# Patient Record
Sex: FEMALE | Race: WHITE | NOT HISPANIC OR LATINO | URBAN - NONMETROPOLITAN AREA
[De-identification: names, ages, dates, MRNs, and addresses within clinical notes are randomized per-mention and may not be internally consistent; named-entity substitution may affect disease eponyms.]

---

## 2019-05-14 ENCOUNTER — ON CAMPUS - OUTPATIENT (OUTPATIENT)
Dept: URBAN - NONMETROPOLITAN AREA HOSPITAL 6 | Facility: HOSPITAL | Age: 54
End: 2019-05-14
Payer: COMMERCIAL

## 2019-05-14 DIAGNOSIS — K57.30 DIVERTICULOSIS OF LARGE INTESTINE WITHOUT PERFORATION OR ABS: ICD-10-CM

## 2019-05-14 DIAGNOSIS — K62.0 ANAL POLYP: ICD-10-CM

## 2019-05-14 DIAGNOSIS — Z12.11 ENCOUNTER FOR SCREENING FOR MALIGNANT NEOPLASM OF COLON: ICD-10-CM

## 2019-05-14 DIAGNOSIS — K64.8 OTHER HEMORRHOIDS: ICD-10-CM

## 2019-05-14 PROCEDURE — 45385 COLONOSCOPY W/LESION REMOVAL: CPT | Mod: 33 | Performed by: INTERNAL MEDICINE

## 2023-08-14 DIAGNOSIS — E66.01 MORBID OBESITY: ICD-10-CM

## 2023-08-14 DIAGNOSIS — G47.10 HYPERSOMNIA: Primary | ICD-10-CM

## 2023-08-14 DIAGNOSIS — G47.8 NON-RESTORATIVE SLEEP: ICD-10-CM

## 2023-10-19 ENCOUNTER — HOSPITAL ENCOUNTER (OUTPATIENT)
Dept: SLEEP MEDICINE | Facility: HOSPITAL | Age: 58
Discharge: HOME | End: 2023-10-19
Admitting: FAMILY MEDICINE
Payer: MEDICAID

## 2023-10-19 DIAGNOSIS — G47.8 NON-RESTORATIVE SLEEP: ICD-10-CM

## 2023-10-19 DIAGNOSIS — E66.01 MORBID OBESITY: ICD-10-CM

## 2023-10-19 DIAGNOSIS — G47.10 HYPERSOMNIA: ICD-10-CM

## 2023-10-19 PROCEDURE — 95806 SLEEP STUDY UNATT&RESP EFFT: CPT

## 2023-10-24 ENCOUNTER — ON CAMPUS - OUTPATIENT (OUTPATIENT)
Dept: URBAN - NONMETROPOLITAN AREA HOSPITAL 6 | Facility: HOSPITAL | Age: 58
End: 2023-10-24
Payer: COMMERCIAL

## 2023-10-24 DIAGNOSIS — K57.30 DIVERTICULOSIS OF LARGE INTESTINE WITHOUT PERFORATION OR ABS: ICD-10-CM

## 2023-10-24 DIAGNOSIS — K29.50 UNSPECIFIED CHRONIC GASTRITIS WITHOUT BLEEDING: ICD-10-CM

## 2023-10-24 DIAGNOSIS — K21.9 GASTRO-ESOPHAGEAL REFLUX DISEASE WITHOUT ESOPHAGITIS: ICD-10-CM

## 2023-10-24 DIAGNOSIS — Z86.010 PERSONAL HISTORY OF COLONIC POLYPS: ICD-10-CM

## 2023-10-24 DIAGNOSIS — R13.10 DYSPHAGIA, UNSPECIFIED: ICD-10-CM

## 2023-10-24 DIAGNOSIS — K20.80 OTHER ESOPHAGITIS WITHOUT BLEEDING: ICD-10-CM

## 2023-10-24 DIAGNOSIS — K22.2 ESOPHAGEAL OBSTRUCTION: ICD-10-CM

## 2023-10-24 DIAGNOSIS — K64.9 UNSPECIFIED HEMORRHOIDS: ICD-10-CM

## 2023-10-24 DIAGNOSIS — D12.3 BENIGN NEOPLASM OF TRANSVERSE COLON: ICD-10-CM

## 2023-10-24 PROCEDURE — 43239 EGD BIOPSY SINGLE/MULTIPLE: CPT | Performed by: INTERNAL MEDICINE

## 2023-10-24 PROCEDURE — 43450 DILATE ESOPHAGUS 1/MULT PASS: CPT | Performed by: INTERNAL MEDICINE

## 2023-10-24 PROCEDURE — 45385 COLONOSCOPY W/LESION REMOVAL: CPT | Mod: PT | Performed by: INTERNAL MEDICINE

## 2023-10-29 DIAGNOSIS — G47.33 OBSTRUCTIVE SLEEP APNEA: Primary | ICD-10-CM

## 2023-10-29 DIAGNOSIS — G47.10 HYPERSOMNIA: ICD-10-CM

## 2023-10-29 DIAGNOSIS — G47.8 NON-RESTORATIVE SLEEP: ICD-10-CM

## 2024-06-07 NOTE — PROGRESS NOTES
CHIEF COMPLAINT  Neck pain      Subjective   Leann Anaya is a 58 y.o. female who was referred by Tyler Jaramillo MD to our pain management clinic for consultation, evaluation and treatment of Neck pain. Neck pain started long time ago without any injuries. She has tried NSAIDs, PT without significant improvement in pain.      Neck pain is 4/10 on VAS, at maximum is 9/10. Pain is achy, numbness, tingling, shooting in nature. Pain is referred left triceps, forearm and fingers. The pain is constant. The pain is improved by PT, meloxicam, flexeril. The pain is worse with turning head, increased activities. +headaches - once-twice a week - left side - start backs of head and wraps to top of head.  Her main complaint is pain in the base of her neck and headaches.     SOAPP- 6  [place] back disability scale - 40    PMH:   BPV, AMY, COPD, smoker- 1 PPD    Current Medications:   Meloxicam 15 mg daily as needed  Flexeril 10 mg   Meclizine      Past Medications:  Percocet - caused nausea     Past Modalities:  TENS:       no          Physical Therapy Within The Last 6 Months     Yes- [facility] - 3/2024 - >7 weeks.   Psychotherapy     no  Massage Therapy      no    Patient Complains Of:  Uro-Fecal Incontinence no  Weight Gain/Loss  no  Fever/Chills   no  Weakness   Yes - Left arm subjective weakness.       PEG Assessment   What number best describes your pain on average in the past week?4  What number best describes how, during the past week, pain has interfered with your enjoyment of life?6  What number best describes how, during the past week, pain has interfered with your general activity?  5      PHQ-9 Depression Screening  Little interest or pleasure in doing things? 2-->more than half the days   Feeling down, depressed, or hopeless? 1-->several days   Trouble falling or staying asleep, or sleeping too much? 3-->nearly every day   Feeling tired or having little energy? 3-->nearly every day   Poor  appetite or overeating? 1-->several days   Feeling bad about yourself - or that you are a failure or have let yourself or your family down? 1-->several days   Trouble concentrating on things, such as reading the newspaper or watching television? 2-->more than half the days   Moving or speaking so slowly that other people could have noticed? Or the opposite - being so fidgety or restless that you have been moving around a lot more than usual? 0-->not at all   Thoughts that you would be better off dead, or of hurting yourself in some way? 0-->not at all   PHQ-9 Total Score 13   If you checked off any problems, how difficult have these problems made it for you to do your work, take care of things at home, or get along with other people? somewhat difficult         Current Outpatient Medications:     azelastine (ASTELIN) 0.1 % nasal spray, 1 spray 2 (Two) Times a Day., Disp: , Rfl:     cyclobenzaprine (FLEXERIL) 10 MG tablet, Take 1 tablet by mouth 3 times a day., Disp: , Rfl:     fluticasone (FLONASE) 50 MCG/ACT nasal spray, 1 spray by Each Nare route 2 (Two) Times a Day., Disp: , Rfl:     hydroCHLOROthiazide (MICROZIDE) 12.5 MG capsule, Take 1 capsule by mouth Every Morning., Disp: , Rfl:     meclizine (ANTIVERT) 12.5 MG tablet, TAKE ONE TABLET BY MOUTH EVERY 12 HOURS AS NEEDED FOR 30 DAYS, Disp: , Rfl:     meloxicam (MOBIC) 15 MG tablet, Take 1 tablet by mouth Daily., Disp: , Rfl:     montelukast (SINGULAIR) 10 MG tablet, Take 1 tablet by mouth Every Evening., Disp: , Rfl:     The following portions of the patient's history were reviewed and updated as appropriate: allergies, current medications, past family history, past medical history, past social history, past surgical history, and problem list.      REVIEW OF PERTINENT MEDICAL DATA    Past Medical History:   Diagnosis Date    COPD (chronic obstructive pulmonary disease)     Neck pain on left side     Sleep apnea      Past Surgical History:   Procedure Laterality  Date    CHOLECYSTECTOMY       Family History   Problem Relation Age of Onset    Stroke Mother     Cancer Father     Cancer Sister      Social History     Socioeconomic History    Marital status:    Tobacco Use    Smoking status: Every Day     Current packs/day: 1.00     Types: Cigarettes   Vaping Use    Vaping status: Never Used   Substance and Sexual Activity    Alcohol use: Not Currently    Drug use: Never    Sexual activity: Defer         Review of Systems   Musculoskeletal:  Positive for arthralgias and neck pain.   Neurological:  Positive for headaches.         Vitals:    06/11/24 1016   BP: 138/85   BP Location: Right arm   Patient Position: Sitting   Cuff Size: Large Adult   Pulse: 97   Resp: 16   SpO2: 96%   Weight: 102 kg (225 lb)   PainSc:   4         Objective   Physical Exam  Neck:     Musculoskeletal:         General: Tenderness present.        Arms:    Neurological:      Deep Tendon Reflexes:      Reflex Scores:       Tricep reflexes are 2+ on the right side and 2+ on the left side.       Bicep reflexes are 2+ on the right side and 2+ on the left side.       Brachioradialis reflexes are 2+ on the right side and 2+ on the left side.     Comments: Motor strength 5/5 b/l LE  Sensory intact b/l LE               Imaging Reviewed:    MRI cervical spine without contrast-4/19/2024-priority radiology  - C4-5-mild disc bulge  C5 6- facet arthropathy more on the right side.  Mild to moderate spinal canal stenosis and moderate right neuroforaminal stenosis  C6-7-left foraminal disc bulge with left greater than right facet arthropathy.  Mild canal stenosis.  Moderate left and mild to moderate right neuroforaminal stenosis  C7-T1-no significant stenosis    Bilateral upper extremity EMG-1/18/2024-Dr. Bonilla  -Normal NCS of left arm.  Showed increased insertional activity from the left lower cervical paraspinal consistent with mild C6-7 radiculopathy.    Assessment:    1. Occipital neuralgia, unspecified  laterality    2. Cervical radiculopathy    3. Cervical spondylosis    4. Occipital headache         Plan:   1.  Defer UDS for now.  2. We discussed trying a course of formal physical therapy.  Physical therapy can help strengthen and stretch the muscles around the joints. Continue to be as active as possible.  Patient has done physical therapy for more than 6 weeks.  3.  Discussed with patient that there are 2 issues affecting her pain.  I believe occipital neuralgia is causing pain in the base of her neck and headaches and cervical stenosis at C6-7 is causing the radicular pain.  Will proceed with occipital nerve block first as her main complaint is headaches and base of neck pain.  4. Patient has pain in the head with referred pain on the top of the head. Bilateral occipital tenderness present. Discussed bilateral greater and lesser occipital nerve block. Discussed the possibility of infection, bleeding, nerve damage, headache, increased pain. Patient understands and agrees.   5.  MRI cervical spine and EMG were discussed with patient.  Patient has neck pain with cervical radiculopathy to left arm consistent with C6 dermatome.  Cervical MRI shows moderate left foraminal stenosis at C6-7 and moderate spinal canal stenosis at C5-C6.  EMG also shows C6-7 radiculopathy on the left side.  She may benefit from ASHANTI C6-7 on the left side in future.  6. Patient screened positive for depression based on a PHQ-9 score of 13 on 6/11/2024. Follow-up recommendations include: Suicide Risk Assessment performed.    RTC for injection and then 3 week follow up.     Reynaldo Pearce DO  Pain Management   [facility]         INSPECT REPORT    As part of the patient's treatment plan, I may be prescribing controlled substances. The patient has been made aware of appropriate use of such medications, including potential risk of somnolence, limited ability to drive and/or work safely, and the potential for dependence or overdose. It has  also been made clear that these medications are for use by this patient only, without concomitant use of alcohol or other substances unless prescribed.     Patient has completed prescribing agreement detailing terms of continued prescribing of controlled substances, including monitoring INSPECT reports, urine drug screening, and pill counts if necessary. The patient is aware that inappropriate use will results in cessation of prescribing such medications.    INSPECT report has been reviewed and scanned into the patient's chart.      EMR Dragon/Transcription Disclaimer:   Much of this encounter note is an electronic transcription/translation of spoken language to printed text. The electronic translation of spoken language may permit erroneous, or at times, nonsensical words or phrases to be inadvertently transcribed; Although I have reviewed the note for such errors, some may still exist.

## 2024-06-11 ENCOUNTER — OFFICE VISIT (OUTPATIENT)
Dept: PAIN MEDICINE | Facility: CLINIC | Age: 59
End: 2024-06-11
Payer: MEDICAID

## 2024-06-11 VITALS
HEART RATE: 97 BPM | SYSTOLIC BLOOD PRESSURE: 138 MMHG | OXYGEN SATURATION: 96 % | WEIGHT: 225 LBS | DIASTOLIC BLOOD PRESSURE: 85 MMHG | RESPIRATION RATE: 16 BRPM

## 2024-06-11 DIAGNOSIS — R51.9 OCCIPITAL HEADACHE: ICD-10-CM

## 2024-06-11 DIAGNOSIS — M54.81 OCCIPITAL NEURALGIA, UNSPECIFIED LATERALITY: Primary | ICD-10-CM

## 2024-06-11 DIAGNOSIS — M54.12 CERVICAL RADICULOPATHY: ICD-10-CM

## 2024-06-11 DIAGNOSIS — M47.812 CERVICAL SPONDYLOSIS: ICD-10-CM

## 2024-06-11 PROCEDURE — 1159F MED LIST DOCD IN RCRD: CPT | Performed by: STUDENT IN AN ORGANIZED HEALTH CARE EDUCATION/TRAINING PROGRAM

## 2024-06-11 PROCEDURE — 99204 OFFICE O/P NEW MOD 45 MIN: CPT | Performed by: STUDENT IN AN ORGANIZED HEALTH CARE EDUCATION/TRAINING PROGRAM

## 2024-06-11 PROCEDURE — 1125F AMNT PAIN NOTED PAIN PRSNT: CPT | Performed by: STUDENT IN AN ORGANIZED HEALTH CARE EDUCATION/TRAINING PROGRAM

## 2024-06-11 PROCEDURE — 1160F RVW MEDS BY RX/DR IN RCRD: CPT | Performed by: STUDENT IN AN ORGANIZED HEALTH CARE EDUCATION/TRAINING PROGRAM

## 2024-06-11 RX ORDER — MELOXICAM 15 MG/1
1 TABLET ORAL DAILY
COMMUNITY
Start: 2024-05-30

## 2024-06-11 RX ORDER — AZELASTINE 1 MG/ML
1 SPRAY, METERED NASAL 2 TIMES DAILY
COMMUNITY
Start: 2024-05-14

## 2024-06-11 RX ORDER — FLUTICASONE PROPIONATE 50 MCG
1 SPRAY, SUSPENSION (ML) NASAL 2 TIMES DAILY
COMMUNITY
Start: 2024-05-14

## 2024-06-11 RX ORDER — HYDROCHLOROTHIAZIDE 12.5 MG/1
12.5 CAPSULE, GELATIN COATED ORAL EVERY MORNING
COMMUNITY
Start: 2024-04-23

## 2024-06-11 RX ORDER — MONTELUKAST SODIUM 10 MG/1
10 TABLET ORAL EVERY EVENING
COMMUNITY
Start: 2024-05-14

## 2024-06-11 RX ORDER — CYCLOBENZAPRINE HCL 10 MG
1 TABLET ORAL 3 TIMES DAILY
COMMUNITY
Start: 2024-04-25

## 2024-06-11 RX ORDER — MECLIZINE HCL 12.5 MG/1
TABLET ORAL
COMMUNITY
Start: 2024-05-14

## 2024-06-25 ENCOUNTER — PROCEDURE VISIT (OUTPATIENT)
Dept: PAIN MEDICINE | Facility: CLINIC | Age: 59
End: 2024-06-25
Payer: MEDICAID

## 2024-06-25 VITALS
WEIGHT: 242 LBS | SYSTOLIC BLOOD PRESSURE: 123 MMHG | RESPIRATION RATE: 16 BRPM | DIASTOLIC BLOOD PRESSURE: 87 MMHG | OXYGEN SATURATION: 94 % | HEART RATE: 90 BPM

## 2024-06-25 DIAGNOSIS — M54.81 OCCIPITAL NEURALGIA, UNSPECIFIED LATERALITY: Primary | ICD-10-CM

## 2024-06-25 PROCEDURE — 64450 NJX AA&/STRD OTHER PN/BRANCH: CPT | Performed by: STUDENT IN AN ORGANIZED HEALTH CARE EDUCATION/TRAINING PROGRAM

## 2024-06-25 PROCEDURE — 64405 NJX AA&/STRD GR OCPL NRV: CPT | Performed by: STUDENT IN AN ORGANIZED HEALTH CARE EDUCATION/TRAINING PROGRAM

## 2024-06-25 RX ORDER — BUPIVACAINE HYDROCHLORIDE 2.5 MG/ML
10 INJECTION, SOLUTION INFILTRATION; PERINEURAL ONCE
Status: COMPLETED | OUTPATIENT
Start: 2024-06-25 | End: 2024-06-25

## 2024-06-25 RX ORDER — METHYLPREDNISOLONE ACETATE 40 MG/ML
40 INJECTION, SUSPENSION INTRA-ARTICULAR; INTRALESIONAL; INTRAMUSCULAR; SOFT TISSUE ONCE
Status: COMPLETED | OUTPATIENT
Start: 2024-06-25 | End: 2024-06-25

## 2024-06-25 RX ADMIN — BUPIVACAINE HYDROCHLORIDE 10 ML: 2.5 INJECTION, SOLUTION INFILTRATION; PERINEURAL at 11:32

## 2024-06-25 RX ADMIN — METHYLPREDNISOLONE ACETATE 40 MG: 40 INJECTION, SUSPENSION INTRA-ARTICULAR; INTRALESIONAL; INTRAMUSCULAR; SOFT TISSUE at 11:33

## 2024-06-25 NOTE — PROGRESS NOTES
H and P reviewed from previous visit and no changes to patient's clinical presentation. Will proceed with procedure as planned. Patient denies history of DM and being on blood thinners.    Reynaldo Pearce DO  Pain Management   [facility]     Pre-procedure diagnosis: B/l occipital neuralgia    The patient's chart was reviewed.  After obtaining written informed consent, the patient was placed in a sitting position with the cervical spine flexed and the forehead on a padded bedside table.  The right occipital artery was palpated at the level of the superior nuchal ridge.  The area was prepped with antiseptic solution.  A 27 gauge 1 1/2 inch needle is inserted just medial to the artery and advanced perpendicular until the needle approached the periostium of the underlying occipital bone.  Then, the needle was redirected superiorly.  After gentle aspiration, 2 ml of the solution was injected in a fanlike manner.  The right lesser occipital nerve was blocked by redirecting the needle laterally and slightly inferiorly.  After gentle aspiration, an additional 2 ml of the solution was injected. The procedure was repeated to inject around the left greater and lesser occipital nerve. A total mixture of 10 ml of 0.25% marcaine with 80 mg depo-medrol was injected slowly.    The patient tolerated the procedure well.  The needle was flushed and withdrawn, and a Band-Aid was applied.    Reynaldo Pearce DO  Pain Management   [facility]

## 2024-07-15 NOTE — PROGRESS NOTES
Subjective   Leann Anaya is a 58 y.o. female is here for follow up for neck pain. Patient was last seen for occipital nerve block with improvement in headaches, but still continues to have neck pain.    On last visit:     Neck pain is 4/10 on VAS, at maximum is 9/10. Pain is achy, numbness, tingling, shooting in nature. Pain is referred left triceps, forearm and fingers. The pain is constant. The pain is improved by PT, meloxicam, flexeril. The pain is worse with turning head, increased activities. +headaches - once-twice a week - left side - start backs of head and wraps to top of head.  Her main complaint is pain in the base of her neck and headaches.    Previous Injection:   6/11/2024-B/l occipital nerve block- 80% improvement in headaches since last visit. Only had 2 headaches since the injection.     Hx: Referred by Tyler Jaramillo MD for Neck pain. Neck pain started long time ago without any injuries. She has tried NSAIDs, PT without significant improvement in pain.                  SOAPP- 6  [place] back disability scale - 40     PMH:   BPV, AMY, COPD, smoker- 1 PPD     Current Medications:   Meloxicam 15 mg daily as needed  Flexeril 10 mg   Meclizine        Past Medications:  Percocet - caused nausea      Past Modalities:  TENS:                                                                          no                                                  Physical Therapy Within The Last 6 Months              Yes- [facility] - 3/2024 - >7 weeks.   Psychotherapy                                                            no  Massage Therapy                                                       no     Patient Complains Of:  Uro-Fecal Incontinence          no  Weight Gain/Loss                   no  Fever/Chills                             no  Weakness                               Yes - Left arm subjective weakness.         PEG Assessment   What number best describes your pain on average in the  past week?4  What number best describes how, during the past week, pain has interfered with your enjoyment of life?6  What number best describes how, during the past week, pain has interfered with your general activity?  5         Current Outpatient Medications:     azelastine (ASTELIN) 0.1 % nasal spray, 1 spray 2 (Two) Times a Day., Disp: , Rfl:     cyclobenzaprine (FLEXERIL) 10 MG tablet, Take 1 tablet by mouth 3 times a day., Disp: , Rfl:     fluticasone (FLONASE) 50 MCG/ACT nasal spray, 1 spray by Each Nare route 2 (Two) Times a Day., Disp: , Rfl:     hydroCHLOROthiazide (MICROZIDE) 12.5 MG capsule, Take 1 capsule by mouth Every Morning., Disp: , Rfl:     meclizine (ANTIVERT) 12.5 MG tablet, TAKE ONE TABLET BY MOUTH EVERY 12 HOURS AS NEEDED FOR 30 DAYS, Disp: , Rfl:     meloxicam (MOBIC) 15 MG tablet, Take 1 tablet by mouth Daily., Disp: , Rfl:     montelukast (SINGULAIR) 10 MG tablet, Take 1 tablet by mouth Every Evening., Disp: , Rfl:     The following portions of the patient's history were reviewed and updated as appropriate: allergies, current medications, past family history, past medical history, past social history, past surgical history, and problem list.      REVIEW OF PERTINENT MEDICAL DATA    Past Medical History:   Diagnosis Date    Cervical disc disorder     Chronic pain disorder     Cluster headache     COPD (chronic obstructive pulmonary disease)     Headache     Headache, tension-type     Joint pain     Low back pain     Migraine     Neck pain on left side     Sleep apnea      Past Surgical History:   Procedure Laterality Date    CHOLECYSTECTOMY       Family History   Problem Relation Age of Onset    Stroke Mother     Cancer Father     Cancer Sister     Early death Maternal Grandmother      Social History     Socioeconomic History    Marital status:    Tobacco Use    Smoking status: Every Day     Current packs/day: 1.00     Average packs/day: 1 pack/day for 15.0 years (15.0 ttl pk-yrs)      Types: Cigarettes   Vaping Use    Vaping status: Never Used   Substance and Sexual Activity    Alcohol use: Not Currently    Drug use: Never    Sexual activity: Not Currently     Partners: Male     Birth control/protection: Condom, Abstinence         Review of Systems   Musculoskeletal:  Positive for arthralgias and neck pain.   Neurological:  Positive for headaches.         Vitals:    07/16/24 0856   BP: 150/80   Pulse: 91   Resp: 16   SpO2: 98%   Weight: 110 kg (242 lb)   PainSc:   6         Objective   Physical Exam  Neck:     Musculoskeletal:         General: Tenderness present.        Arms:    Neurological:      Deep Tendon Reflexes:      Reflex Scores:       Tricep reflexes are 2+ on the right side and 2+ on the left side.       Bicep reflexes are 2+ on the right side and 2+ on the left side.       Brachioradialis reflexes are 2+ on the right side and 2+ on the left side.     Comments: Motor strength 5/5 b/l LE  Sensory intact b/l LE               Imaging Reviewed:  MRI cervical spine without contrast-4/19/2024-priority radiology  - C4-5-mild disc bulge  C5 6- facet arthropathy more on the right side.  Mild to moderate spinal canal stenosis and moderate right neuroforaminal stenosis  C6-7-left foraminal disc bulge with left greater than right facet arthropathy.  Mild canal stenosis.  Moderate left and mild to moderate right neuroforaminal stenosis  C7-T1-no significant stenosis     Bilateral upper extremity EMG-1/18/2024-Dr. Bonilla  -Normal NCS of left arm.  Showed increased insertional activity from the left lower cervical paraspinal consistent with mild C6-7 radiculopathy.    Assessment:    1. Cervical radiculopathy    2. Cervical spondylosis    3. Occipital neuralgia, unspecified laterality         Plan:   1.  Defer UDS for now.  2. We discussed trying a course of formal physical therapy.  Physical therapy can help strengthen and stretch the muscles around the joints. Continue to be as active as  possible.  Patient has done physical therapy for more than 6 weeks.  3.  Discussed with patient that there are 2 issues affecting her pain.  I believe occipital neuralgia is causing pain in the base of her neck and headaches and cervical stenosis at C6-7 is causing the radicular pain.    4. Excellent relief with bilateral greater and lesser occipital nerve block. Repeat PRN.   5.  MRI cervical spine and EMG were discussed with patient.  Patient has neck pain with cervical radiculopathy to left arm consistent with C6 dermatome.  Cervical MRI shows moderate left foraminal stenosis at C6-7 and moderate spinal canal stenosis at C5-C6.  EMG also shows C6-7 radiculopathy on the left side.  She may benefit from ASHANTI C6-7 on the left side. Benefits and risks were discussed. Agreed to proceed. .    RTC  for ASHANTI.     Reynaldo Pearce DO  Pain Management   [facility]                INSPECT REPORT    As part of the patient's treatment plan, I may be prescribing controlled substances. The patient has been made aware of appropriate use of such medications, including potential risk of somnolence, limited ability to drive and/or work safely, and the potential for dependence or overdose. It has also been made clear that these medications are for use by this patient only, without concomitant use of alcohol or other substances unless prescribed.     Patient has completed prescribing agreement detailing terms of continued prescribing of controlled substances, including monitoring INSPECT reports, urine drug screening, and pill counts if necessary. The patient is aware that inappropriate use will results in cessation of prescribing such medications.    INSPECT report has been reviewed and scanned into the patient's chart.

## 2024-07-16 ENCOUNTER — OFFICE VISIT (OUTPATIENT)
Dept: PAIN MEDICINE | Facility: CLINIC | Age: 59
End: 2024-07-16
Payer: MEDICAID

## 2024-07-16 VITALS
SYSTOLIC BLOOD PRESSURE: 150 MMHG | OXYGEN SATURATION: 98 % | RESPIRATION RATE: 16 BRPM | DIASTOLIC BLOOD PRESSURE: 80 MMHG | WEIGHT: 242 LBS | HEART RATE: 91 BPM

## 2024-07-16 DIAGNOSIS — M54.12 CERVICAL RADICULOPATHY: Primary | ICD-10-CM

## 2024-07-16 DIAGNOSIS — M47.812 CERVICAL SPONDYLOSIS: ICD-10-CM

## 2024-07-16 DIAGNOSIS — M54.81 OCCIPITAL NEURALGIA, UNSPECIFIED LATERALITY: ICD-10-CM

## 2024-07-16 PROCEDURE — 1159F MED LIST DOCD IN RCRD: CPT | Performed by: STUDENT IN AN ORGANIZED HEALTH CARE EDUCATION/TRAINING PROGRAM

## 2024-07-16 PROCEDURE — 1125F AMNT PAIN NOTED PAIN PRSNT: CPT | Performed by: STUDENT IN AN ORGANIZED HEALTH CARE EDUCATION/TRAINING PROGRAM

## 2024-07-16 PROCEDURE — 1160F RVW MEDS BY RX/DR IN RCRD: CPT | Performed by: STUDENT IN AN ORGANIZED HEALTH CARE EDUCATION/TRAINING PROGRAM

## 2024-07-16 PROCEDURE — 99214 OFFICE O/P EST MOD 30 MIN: CPT | Performed by: STUDENT IN AN ORGANIZED HEALTH CARE EDUCATION/TRAINING PROGRAM

## 2024-08-08 ENCOUNTER — HOSPITAL ENCOUNTER (OUTPATIENT)
Dept: PAIN MEDICINE | Facility: HOSPITAL | Age: 59
Discharge: HOME | End: 2024-08-08
Payer: MEDICAID

## 2024-08-08 VITALS
RESPIRATION RATE: 16 BRPM | DIASTOLIC BLOOD PRESSURE: 90 MMHG | BODY MASS INDEX: 41.32 KG/M2 | WEIGHT: 242 LBS | HEIGHT: 64 IN | HEART RATE: 83 BPM | SYSTOLIC BLOOD PRESSURE: 159 MMHG | OXYGEN SATURATION: 96 % | TEMPERATURE: 96.9 F

## 2024-08-08 DIAGNOSIS — R52 PAIN: ICD-10-CM

## 2024-08-08 DIAGNOSIS — M54.12 CERVICAL RADICULOPATHY: Primary | ICD-10-CM

## 2024-08-08 PROCEDURE — 25510000001 IOPAMIDOL 41 % SOLUTION: Performed by: STUDENT IN AN ORGANIZED HEALTH CARE EDUCATION/TRAINING PROGRAM

## 2024-08-08 PROCEDURE — 62321 NJX INTERLAMINAR CRV/THRC: CPT | Performed by: STUDENT IN AN ORGANIZED HEALTH CARE EDUCATION/TRAINING PROGRAM

## 2024-08-08 PROCEDURE — 25010000002 DEXAMETHASONE SODIUM PHOSPHATE 10 MG/ML SOLUTION: Performed by: STUDENT IN AN ORGANIZED HEALTH CARE EDUCATION/TRAINING PROGRAM

## 2024-08-08 PROCEDURE — 77003 FLUOROGUIDE FOR SPINE INJECT: CPT

## 2024-08-08 RX ORDER — DEXAMETHASONE SODIUM PHOSPHATE 10 MG/ML
10 INJECTION, SOLUTION INTRAMUSCULAR; INTRAVENOUS ONCE
Status: COMPLETED | OUTPATIENT
Start: 2024-08-08 | End: 2024-08-08

## 2024-08-08 RX ORDER — IOPAMIDOL 408 MG/ML
3 INJECTION, SOLUTION INTRATHECAL
Status: COMPLETED | OUTPATIENT
Start: 2024-08-08 | End: 2024-08-08

## 2024-08-08 RX ADMIN — IOPAMIDOL 3 ML: 408 INJECTION, SOLUTION INTRATHECAL at 09:09

## 2024-08-08 RX ADMIN — DEXAMETHASONE SODIUM PHOSPHATE 10 MG: 10 INJECTION, SOLUTION INTRAMUSCULAR; INTRAVENOUS at 09:10

## 2024-08-08 NOTE — H&P
H and P reviewed from previous visit and no changes to patient's clinical presentation. Will proceed with procedure as planned. Patient denies history of DM and being on blood thinners.    Reynaldo Pearce DO  Pain Management   [facility]

## 2024-08-08 NOTE — DISCHARGE INSTRUCTIONS

## 2024-08-08 NOTE — PROCEDURES
PREOPERATIVE DIAGNOSIS:    1. Cervical DDD    POSTOPERATIVE DIAGNOSIS:  Same.    PROCEDURE PERFORMED: Cervical GERA C6-7     PROCEDURE IN DETAIL:    Written informed consent was taken from the patient. Pre-procedure blood pressure and pulse were stable and recorded in patients clinic chart. The patient was brought to the procedure room and placed in the prone position on fluoroscopy table. The patient’s neck was prepped with chloraprep and draped in the usual sterile fashion.   The skin over the C6-7 space was identified under fluoroscopic guidance and infiltrated with 1% lidocaine for local anesthesia via 25 gauge needle.  A 17 gauge Tuohy needle was inserted through the skin under fluoroscopic guidance until we got to the epidural space with loss of resistance technique.  Negative for CSF and heme.  2 ml of omnipaque contrast was injected under continuous fluoroscopic guidance and good spread was noted from C5-7 space bilaterally. 10 mg of dexamethasone mixed with 4 ml of preservative free normal saline was injected with minimal pressure. The needle was cleared with preservative free normal saline and removed. Band aid was applied.  Following the procedure the patient's vital signs were stable. The patient was discharged home in good condition after being given discharge instructions.    COMPLICATIONS: None    Reynaldo Pearce DO  Pain Management   [facility]

## 2024-08-09 ENCOUNTER — TELEPHONE (OUTPATIENT)
Dept: PAIN MEDICINE | Facility: HOSPITAL | Age: 59
End: 2024-08-09
Payer: MEDICAID

## 2024-08-23 NOTE — PROGRESS NOTES
Subjective   Leann Anaya is a 58 y.o. female is here for follow up for neck pain.  Patient was last seen for ASHANTI on 8/8/2024.    On last visit:     Neck pain is 4/10 on VAS, at maximum is 6/10. Pain is achiness in nature. Pain is referred left triceps, forearm and fingers- this is mostly resolved with ASHANTI. The pain is constant. The pain is improved by PT, meloxicam, flexeril, ASHANTI. The pain is worse with turning head, increased activities. +headaches - once-twice a week - left side - start backs of head and wraps to top of head.  Her main complaint is pain in the base of her neck and headaches.    Previous Injection:   8/8/2024-ASHANTI C6-7- 70% pain improvement. Gripping is better. No locking of neck. No headaches.   6/11/2024-B/l occipital nerve block- 80% improvement in headaches since last visit. Only had 2 headaches since the injection.     Hx: Referred by Tyler Jaramillo MD for Neck pain. Neck pain started long time ago without any injuries. She has tried NSAIDs, PT without significant improvement in pain.                  SOAPP- 6  [place] back disability scale - 40     PMH:   DM-2, BPV, AMY, COPD, smoker- 1 PPD     Current Medications:   Meloxicam 15 mg daily as needed  Flexeril 10 mg   Meclizine        Past Medications:  Percocet - caused nausea      Past Modalities:  TENS:                                                                          no                                                  Physical Therapy Within The Last 6 Months              Yes- [facility] rehab - 3/2024 - >7 weeks.   Psychotherapy                                                            no  Massage Therapy                                                       no     Patient Complains Of:  Uro-Fecal Incontinence          no  Weight Gain/Loss                   no  Fever/Chills                             no  Weakness                               Yes - Left arm subjective weakness.         PEG Assessment   What  number best describes your pain on average in the past week?4  What number best describes how, during the past week, pain has interfered with your enjoyment of life?6  What number best describes how, during the past week, pain has interfered with your general activity?  5         Current Outpatient Medications:     azelastine (ASTELIN) 0.1 % nasal spray, 1 spray 2 (Two) Times a Day., Disp: , Rfl:     cyclobenzaprine (FLEXERIL) 10 MG tablet, Take 1 tablet by mouth 3 times a day., Disp: , Rfl:     famotidine (PEPCID) 40 MG tablet, Take 1 tablet by mouth Daily With Breakfast., Disp: , Rfl:     fluticasone (FLONASE) 50 MCG/ACT nasal spray, 1 spray by Each Nare route 2 (Two) Times a Day., Disp: , Rfl:     hydroCHLOROthiazide (MICROZIDE) 12.5 MG capsule, Take 1 capsule by mouth Every Morning., Disp: , Rfl:     meclizine (ANTIVERT) 12.5 MG tablet, TAKE ONE TABLET BY MOUTH EVERY 12 HOURS AS NEEDED FOR 30 DAYS, Disp: , Rfl:     meloxicam (MOBIC) 15 MG tablet, Take 1 tablet by mouth Daily., Disp: , Rfl:     metFORMIN (GLUCOPHAGE) 500 MG tablet, Daily., Disp: , Rfl:     montelukast (SINGULAIR) 10 MG tablet, Take 1 tablet by mouth Every Evening., Disp: , Rfl:     The following portions of the patient's history were reviewed and updated as appropriate: allergies, current medications, past family history, past medical history, past social history, past surgical history, and problem list.      REVIEW OF PERTINENT MEDICAL DATA    Past Medical History:   Diagnosis Date    Cervical disc disorder     Chronic pain disorder     Cluster headache     COPD (chronic obstructive pulmonary disease)     Diabetes     Headache     Headache, tension-type     Joint pain     Low back pain     Migraine     Neck pain on left side     Sleep apnea      Past Surgical History:   Procedure Laterality Date    CHOLECYSTECTOMY       Family History   Problem Relation Age of Onset    Stroke Mother     Cancer Father     Cancer Sister     Early death Maternal  Grandmother      Social History     Socioeconomic History    Marital status:    Tobacco Use    Smoking status: Every Day     Current packs/day: 1.00     Average packs/day: 1 pack/day for 15.0 years (15.0 ttl pk-yrs)     Types: Cigarettes   Vaping Use    Vaping status: Never Used   Substance and Sexual Activity    Alcohol use: Not Currently    Drug use: Never    Sexual activity: Not Currently     Partners: Male     Birth control/protection: Condom, Abstinence         Review of Systems   Musculoskeletal:  Positive for arthralgias and neck pain.   Neurological:  Positive for headaches.         Vitals:    08/27/24 0845   BP: 146/91   Pulse: 90   Resp: 16   SpO2: 96%   Weight: 108 kg (238 lb)   PainSc:   6           Objective   Physical Exam  Neck:     Musculoskeletal:         General: Tenderness present.        Arms:    Neurological:      Deep Tendon Reflexes:      Reflex Scores:       Tricep reflexes are 2+ on the right side and 2+ on the left side.       Bicep reflexes are 2+ on the right side and 2+ on the left side.       Brachioradialis reflexes are 2+ on the right side and 2+ on the left side.     Comments: Motor strength 5/5 b/l LE  Sensory intact b/l LE               Imaging Reviewed:  MRI cervical spine without contrast-4/19/2024-priority radiology  - C4-5-mild disc bulge  C5 6- facet arthropathy more on the right side.  Mild to moderate spinal canal stenosis and moderate right neuroforaminal stenosis  C6-7-left foraminal disc bulge with left greater than right facet arthropathy.  Mild canal stenosis.  Moderate left and mild to moderate right neuroforaminal stenosis  C7-T1-no significant stenosis     Bilateral upper extremity EMG-1/18/2024-Dr. Bonilla  -Normal NCS of left arm.  Showed increased insertional activity from the left lower cervical paraspinal consistent with mild C6-7 radiculopathy.    Assessment:    1. Cervical radiculopathy    2. Cervical spondylosis    3. Occipital neuralgia, unspecified  laterality         Plan:   1.  Defer UDS for now.  2. We discussed trying a course of formal physical therapy.  Physical therapy can help strengthen and stretch the muscles around the joints. Continue to be as active as possible.  Patient has done physical therapy for more than 6 weeks.  3.  Discussed with patient that there are 2 issues affecting her pain.  I believe occipital neuralgia is causing pain in the base of her neck and headaches and cervical stenosis at C6-7 is causing the radicular pain.    4. Excellent relief with bilateral greater and lesser occipital nerve block. Repeat PRN.   5.  Good relief with ASHANTI.   6. Mos of her complaint is ache in neck. We discussed starting gabapentin, but she would like to hold off on it as her pain is mostly tolerable.     RTC PRN.     Reynaldo Pearce DO  Pain Management   [facility]                INSPECT REPORT    As part of the patient's treatment plan, I may be prescribing controlled substances. The patient has been made aware of appropriate use of such medications, including potential risk of somnolence, limited ability to drive and/or work safely, and the potential for dependence or overdose. It has also been made clear that these medications are for use by this patient only, without concomitant use of alcohol or other substances unless prescribed.     Patient has completed prescribing agreement detailing terms of continued prescribing of controlled substances, including monitoring INSPECT reports, urine drug screening, and pill counts if necessary. The patient is aware that inappropriate use will results in cessation of prescribing such medications.    INSPECT report has been reviewed and scanned into the patient's chart.

## 2024-08-27 ENCOUNTER — OFFICE VISIT (OUTPATIENT)
Dept: PAIN MEDICINE | Facility: CLINIC | Age: 59
End: 2024-08-27
Payer: MEDICAID

## 2024-08-27 VITALS
DIASTOLIC BLOOD PRESSURE: 91 MMHG | WEIGHT: 238 LBS | RESPIRATION RATE: 16 BRPM | BODY MASS INDEX: 40.85 KG/M2 | OXYGEN SATURATION: 96 % | SYSTOLIC BLOOD PRESSURE: 146 MMHG | HEART RATE: 90 BPM

## 2024-08-27 DIAGNOSIS — M47.812 CERVICAL SPONDYLOSIS: ICD-10-CM

## 2024-08-27 DIAGNOSIS — M54.81 OCCIPITAL NEURALGIA, UNSPECIFIED LATERALITY: ICD-10-CM

## 2024-08-27 DIAGNOSIS — M54.12 CERVICAL RADICULOPATHY: Primary | ICD-10-CM

## 2024-08-27 PROCEDURE — 1159F MED LIST DOCD IN RCRD: CPT | Performed by: STUDENT IN AN ORGANIZED HEALTH CARE EDUCATION/TRAINING PROGRAM

## 2024-08-27 PROCEDURE — 1125F AMNT PAIN NOTED PAIN PRSNT: CPT | Performed by: STUDENT IN AN ORGANIZED HEALTH CARE EDUCATION/TRAINING PROGRAM

## 2024-08-27 PROCEDURE — 1160F RVW MEDS BY RX/DR IN RCRD: CPT | Performed by: STUDENT IN AN ORGANIZED HEALTH CARE EDUCATION/TRAINING PROGRAM

## 2024-08-27 PROCEDURE — 99213 OFFICE O/P EST LOW 20 MIN: CPT | Performed by: STUDENT IN AN ORGANIZED HEALTH CARE EDUCATION/TRAINING PROGRAM

## 2024-08-27 RX ORDER — FAMOTIDINE 40 MG/1
40 TABLET, FILM COATED ORAL
COMMUNITY
Start: 2024-08-06

## 2024-10-07 ENCOUNTER — TELEPHONE (OUTPATIENT)
Dept: PAIN MEDICINE | Facility: CLINIC | Age: 59
End: 2024-10-07
Payer: MEDICAID

## 2024-10-07 NOTE — TELEPHONE ENCOUNTER
Provider: DR. ADLER    Caller: NATI MARIE    Relationship to Patient: SELF    Phone Number: [phone]    Reason for Call: PATIENT CALLED WANTING TO KNOW IF SHE CAN BE WORKED IN. DX: Cervical radiculopathy, Cervical spondylosis AND Occipital neuralgia, unspecified laterality. FIRST AVAILABILITY IS IN NOVEMBER IN [place] BUT PATIENT WANTS TO BE SEEN SOONER IF POSSIBLE. PLEASE ADVISE.

## 2024-10-08 ENCOUNTER — OFFICE VISIT (OUTPATIENT)
Dept: PAIN MEDICINE | Facility: CLINIC | Age: 59
End: 2024-10-08
Payer: MEDICAID

## 2024-10-08 VITALS
WEIGHT: 228 LBS | RESPIRATION RATE: 16 BRPM | SYSTOLIC BLOOD PRESSURE: 142 MMHG | BODY MASS INDEX: 39.14 KG/M2 | HEART RATE: 86 BPM | OXYGEN SATURATION: 96 % | DIASTOLIC BLOOD PRESSURE: 84 MMHG

## 2024-10-08 DIAGNOSIS — M54.81 OCCIPITAL NEURALGIA, UNSPECIFIED LATERALITY: ICD-10-CM

## 2024-10-08 DIAGNOSIS — M54.12 CERVICAL RADICULOPATHY: Primary | ICD-10-CM

## 2024-10-08 PROCEDURE — 99213 OFFICE O/P EST LOW 20 MIN: CPT | Performed by: STUDENT IN AN ORGANIZED HEALTH CARE EDUCATION/TRAINING PROGRAM

## 2024-10-08 PROCEDURE — 1160F RVW MEDS BY RX/DR IN RCRD: CPT | Performed by: STUDENT IN AN ORGANIZED HEALTH CARE EDUCATION/TRAINING PROGRAM

## 2024-10-08 PROCEDURE — 1159F MED LIST DOCD IN RCRD: CPT | Performed by: STUDENT IN AN ORGANIZED HEALTH CARE EDUCATION/TRAINING PROGRAM

## 2024-10-08 PROCEDURE — 1125F AMNT PAIN NOTED PAIN PRSNT: CPT | Performed by: STUDENT IN AN ORGANIZED HEALTH CARE EDUCATION/TRAINING PROGRAM

## 2024-10-08 NOTE — PROGRESS NOTES
Subjective   Leann Anaya is a 58 y.o. female is here for follow up for neck pain.  Patient was last seen on 8/27/2024.  Returning earlier due to increased pain and headaches on left side for last 1 week.     On last visit:     Neck pain is 4/10 on VAS, at maximum is 6/10. Pain is achiness in nature. Pain is referred left triceps, forearm and fingers- this is mostly resolved with ASHANTI. The pain is constant. The pain is improved by PT, meloxicam, flexeril, ASHANTI. The pain is worse with turning head, increased activities. +headaches - once-twice a week - left side - start backs of head and wraps to top of head.  Her main complaint is pain in the base of her neck and headaches.    Left sided base of neck pain is 8/10 on VAS. Radiating to top of head. +headaches.     Previous Injection:   8/8/2024-ASHANTI C6-7- 70% pain improvement. Gripping is better. No locking of neck. No headaches.   6/11/2024-B/l occipital nerve block- 80% improvement in headaches since last visit. Only had 2 headaches since the injection- lasted for 3 months.      Hx: Referred by Tyler Jaramillo MD for Neck pain. Neck pain started long time ago without any injuries. She has tried NSAIDs, PT without significant improvement in pain.                  SOAPP- 6  [place] back disability scale - 40     PMH:   DM-2, BPV, AMY, COPD, smoker- 1 PPD     Current Medications:   Meloxicam 15 mg daily as needed  Flexeril 10 mg   Meclizine        Past Medications:  Percocet - caused nausea      Past Modalities:  TENS:                                                                          no                                                  Physical Therapy Within The Last 6 Months              Yes- [facility] - 3/2024 - >7 weeks.   Psychotherapy                                                            no  Massage Therapy                                                       no     Patient Complains Of:  Uro-Fecal Incontinence          no  Weight  Gain/Loss                   no  Fever/Chills                             no  Weakness                               Yes - Left arm subjective weakness.         PEG Assessment   What number best describes your pain on average in the past week?4  What number best describes how, during the past week, pain has interfered with your enjoyment of life?6  What number best describes how, during the past week, pain has interfered with your general activity?  5         Current Outpatient Medications:     azelastine (ASTELIN) 0.1 % nasal spray, 1 spray 2 (Two) Times a Day., Disp: , Rfl:     cyclobenzaprine (FLEXERIL) 10 MG tablet, Take 1 tablet by mouth 3 times a day., Disp: , Rfl:     famotidine (PEPCID) 40 MG tablet, Take 1 tablet by mouth Daily With Breakfast., Disp: , Rfl:     fluticasone (FLONASE) 50 MCG/ACT nasal spray, 1 spray by Each Nare route 2 (Two) Times a Day., Disp: , Rfl:     hydroCHLOROthiazide (MICROZIDE) 12.5 MG capsule, Take 1 capsule by mouth Every Morning., Disp: , Rfl:     meclizine (ANTIVERT) 12.5 MG tablet, TAKE ONE TABLET BY MOUTH EVERY 12 HOURS AS NEEDED FOR 30 DAYS, Disp: , Rfl:     meloxicam (MOBIC) 15 MG tablet, Take 1 tablet by mouth Daily., Disp: , Rfl:     metFORMIN (GLUCOPHAGE) 500 MG tablet, Daily., Disp: , Rfl:     montelukast (SINGULAIR) 10 MG tablet, Take 1 tablet by mouth Every Evening., Disp: , Rfl:     The following portions of the patient's history were reviewed and updated as appropriate: allergies, current medications, past family history, past medical history, past social history, past surgical history, and problem list.      REVIEW OF PERTINENT MEDICAL DATA    Past Medical History:   Diagnosis Date    Cervical disc disorder     Chronic pain disorder     Cluster headache     COPD (chronic obstructive pulmonary disease)     Diabetes     Headache     Headache, tension-type     Joint pain     Low back pain     Migraine     Neck pain on left side     Sleep apnea      Past Surgical  History:   Procedure Laterality Date    CHOLECYSTECTOMY       Family History   Problem Relation Age of Onset    Stroke Mother     Cancer Father     Cancer Sister     Early death Maternal Grandmother      Social History     Socioeconomic History    Marital status:    Tobacco Use    Smoking status: Every Day     Current packs/day: 1.00     Average packs/day: 1 pack/day for 15.0 years (15.0 ttl pk-yrs)     Types: Cigarettes   Vaping Use    Vaping status: Never Used   Substance and Sexual Activity    Alcohol use: Not Currently    Drug use: Never    Sexual activity: Not Currently     Partners: Male     Birth control/protection: Condom, Abstinence         Review of Systems   Musculoskeletal:  Positive for arthralgias and neck pain.   Neurological:  Positive for headaches.         Vitals:    10/08/24 1108   BP: 142/84   Pulse: 86   Resp: 16   SpO2: 96%   Weight: 103 kg (228 lb)   PainSc:   8             Objective   Physical Exam  Neck:     Musculoskeletal:         General: Tenderness present.        Arms:    Neurological:      Deep Tendon Reflexes:      Reflex Scores:       Tricep reflexes are 2+ on the right side and 2+ on the left side.       Bicep reflexes are 2+ on the right side and 2+ on the left side.       Brachioradialis reflexes are 2+ on the right side and 2+ on the left side.     Comments: Motor strength 5/5 b/l LE  Sensory intact b/l LE               Imaging Reviewed:  MRI cervical spine without contrast-4/19/2024-priority radiology  - C4-5-mild disc bulge  C5 6- facet arthropathy more on the right side.  Mild to moderate spinal canal stenosis and moderate right neuroforaminal stenosis  C6-7-left foraminal disc bulge with left greater than right facet arthropathy.  Mild canal stenosis.  Moderate left and mild to moderate right neuroforaminal stenosis  C7-T1-no significant stenosis     Bilateral upper extremity EMG-1/18/2024-Dr. Bonilla  -Normal NCS of left arm.  Showed increased insertional activity  from the left lower cervical paraspinal consistent with mild C6-7 radiculopathy.    Assessment:    1. Cervical radiculopathy    2. Occipital neuralgia, unspecified laterality        Plan:   1.  Defer UDS for now.  2. We discussed trying a course of formal physical therapy.  Physical therapy can help strengthen and stretch the muscles around the joints. Continue to be as active as possible.  Patient has done physical therapy for more than 6 weeks.  3.  Discussed with patient that there are 2 issues affecting her pain.  I believe occipital neuralgia is causing pain in the base of her neck and headaches and cervical stenosis at C6-7 is causing the radicular pain.    4. Excellent relief with bilateral greater and lesser occipital nerve block. Left sided pain is coming back. Patient has pain in the head with referred pain on the top of the head. Left Occipital tenderness present. Discussed left greater and lesser occipital nerve block. Discussed the possibility of infection, bleeding, nerve damage, headache, increased pain. Patient understands and agrees.  5.  Good relief with ASHANTI.   6. Mos of her complaint is ache in neck. We discussed starting gabapentin, but she would like to hold off on it as her pain is mostly tolerable.     RTC for occipital N block on L side and then 3 weeks follow up.      Reynaldo Pearce DO  Pain Management   [facility]                INSPECT REPORT    As part of the patient's treatment plan, I may be prescribing controlled substances. The patient has been made aware of appropriate use of such medications, including potential risk of somnolence, limited ability to drive and/or work safely, and the potential for dependence or overdose. It has also been made clear that these medications are for use by this patient only, without concomitant use of alcohol or other substances unless prescribed.     Patient has completed prescribing agreement detailing terms of continued prescribing of controlled  substances, including monitoring INSPECT reports, urine drug screening, and pill counts if necessary. The patient is aware that inappropriate use will results in cessation of prescribing such medications.    INSPECT report has been reviewed and scanned into the patient's chart.

## 2024-10-29 ENCOUNTER — PROCEDURE VISIT (OUTPATIENT)
Dept: PAIN MEDICINE | Facility: CLINIC | Age: 59
End: 2024-10-29
Payer: MEDICAID

## 2024-10-29 VITALS
OXYGEN SATURATION: 95 % | RESPIRATION RATE: 16 BRPM | HEART RATE: 87 BPM | SYSTOLIC BLOOD PRESSURE: 134 MMHG | DIASTOLIC BLOOD PRESSURE: 81 MMHG

## 2024-10-29 DIAGNOSIS — M54.81 OCCIPITAL NEURALGIA, UNSPECIFIED LATERALITY: Primary | ICD-10-CM

## 2024-10-29 PROCEDURE — 64405 NJX AA&/STRD GR OCPL NRV: CPT | Performed by: STUDENT IN AN ORGANIZED HEALTH CARE EDUCATION/TRAINING PROGRAM

## 2024-10-29 PROCEDURE — 64450 NJX AA&/STRD OTHER PN/BRANCH: CPT | Performed by: STUDENT IN AN ORGANIZED HEALTH CARE EDUCATION/TRAINING PROGRAM

## 2024-10-29 RX ORDER — METHYLPREDNISOLONE ACETATE 40 MG/ML
40 INJECTION, SUSPENSION INTRA-ARTICULAR; INTRALESIONAL; INTRAMUSCULAR; SOFT TISSUE ONCE
Status: COMPLETED | OUTPATIENT
Start: 2024-10-29 | End: 2024-10-29

## 2024-10-29 RX ORDER — ALBUTEROL SULFATE 90 UG/1
INHALANT RESPIRATORY (INHALATION)
COMMUNITY
Start: 2024-10-01

## 2024-10-29 RX ORDER — BUPIVACAINE HYDROCHLORIDE 2.5 MG/ML
5 INJECTION, SOLUTION INFILTRATION; PERINEURAL ONCE
Status: COMPLETED | OUTPATIENT
Start: 2024-10-29 | End: 2024-10-29

## 2024-10-29 RX ORDER — TOPIRAMATE 50 MG/1
50 TABLET, FILM COATED ORAL EVERY EVENING
COMMUNITY
Start: 2024-10-01

## 2024-10-29 RX ADMIN — BUPIVACAINE HYDROCHLORIDE 5 ML: 2.5 INJECTION, SOLUTION INFILTRATION; PERINEURAL at 11:36

## 2024-10-29 RX ADMIN — METHYLPREDNISOLONE ACETATE 40 MG: 40 INJECTION, SUSPENSION INTRA-ARTICULAR; INTRALESIONAL; INTRAMUSCULAR; SOFT TISSUE at 11:37

## 2024-10-29 NOTE — PROGRESS NOTES
H and P reviewed from previous visit and no changes to patient's clinical presentation. Will proceed with procedure as planned.     Reynaldo Pearce DO  Pain Management   [facility]     Dx: Left occipital neuralgia.   Procedure: Left greater and lesser occipital nerve block  The patient's chart was reviewed.  After obtaining written informed consent, the patient was placed in a sitting position with the cervical spine flexed and the forehead on a padded bedside table.  The left occipital artery was palpated at the level of the superior nuchal ridge.  The area was prepped with antiseptic solution.  A 27 gauge 1 1/2 inch needle is inserted just medial to the artery and advanced perpendicular until the needle approached the periostium of the underlying occipital bone.  Then, the needle was redirected superiorly.  After gentle aspiration, 2 ml of the solution was injected in a fanlike manner.  The left lesser occipital nerve was blocked by redirecting the needle laterally and slightly inferiorly, 2 ml of the solution was injected in a fanlike manner.  A mixture of 0.25% marcaine with 40 mg of depo-medrol was injected slowly.  The patient tolerated the procedure well.  The needle was flushed and withdrawn, and a Band-Aid was applied.    TPI was done to left trapezius with 5 cc solution of 0.25% bupivacaine and 40 mg of depo. Tolerated procedure without any complications.     Reynaldo Pearce DO  Pain Management   [facility]

## 2024-11-14 NOTE — PROGRESS NOTES
Subjective   Leann Anaya is a 58 y.o. female is here for follow up for neck pain.  Patient was last seen for left occipital nerve block. She has been started on gabapentin 600 mg qhs by her PCP. She hasn't noticed significant difference with gabapentin yet after 3 weeks.     On last visit:     Neck pain is 4/10 on VAS, at maximum is 6/10. Pain is achiness in nature. Pain is referred left triceps, forearm and fingers- this is mostly resolved with ASHANTI. The pain is constant. The pain is improved by PT, meloxicam, flexeril, ASHANTI. The pain is worse with turning head, increased activities. +headaches - once-twice a week - left side - start backs of head and wraps to top of head.  Her main complaint is pain in the base of her neck and headaches.    Left sided base of neck pain is 8/10 on VAS. Radiating to top of head. +headaches.     Previous Injection:   10/29/2024-left occipital nerve block- couple days of relief.   8/8/2024-ASHANTI C6-7- 70% pain improvement. Gripping is better. No locking of neck. No headaches.   6/11/2024-B/l occipital nerve block- 80% improvement in headaches since last visit. Only had 2 headaches since the injection- lasted for 3 months.      Hx: Referred by yTler Jaramillo MD for Neck pain. Neck pain started long time ago without any injuries. She has tried NSAIDs, PT without significant improvement in pain.                  SOAPP- 6  [place] back disability scale - 40     PMH:   DM-2, BPV, AMY, COPD, smoker- 1 PPD     Current Medications:   Gabapentin 600 mg qhs   Meloxicam 15 mg daily as needed  Flexeril 10 mg   Meclizine        Past Medications:  Percocet - caused nausea      Past Modalities:  TENS:                                                                          no                                                  Physical Therapy Within The Last 6 Months              Yes- [facility] - 3/2024 - >7 weeks.   Psychotherapy                                                             no  Massage Therapy                                                       no     Patient Complains Of:  Uro-Fecal Incontinence          no  Weight Gain/Loss                   no  Fever/Chills                             no  Weakness                               Yes - Left arm subjective weakness.         PEG Assessment   What number best describes your pain on average in the past week?4  What number best describes how, during the past week, pain has interfered with your enjoyment of life?6  What number best describes how, during the past week, pain has interfered with your general activity?  5         Current Outpatient Medications:     albuterol sulfate  (90 Base) MCG/ACT inhaler, INHALE 1 PUFF EVERY 4 HOURS AS NEEDED, Disp: , Rfl:     azelastine (ASTELIN) 0.1 % nasal spray, 1 spray 2 (Two) Times a Day., Disp: , Rfl:     cyclobenzaprine (FLEXERIL) 10 MG tablet, Take 1 tablet by mouth 3 times a day., Disp: , Rfl:     famotidine (PEPCID) 40 MG tablet, Take 1 tablet by mouth Daily With Breakfast., Disp: , Rfl:     fluticasone (FLONASE) 50 MCG/ACT nasal spray, 1 spray by Each Nare route 2 (Two) Times a Day., Disp: , Rfl:     gabapentin (NEURONTIN) 600 MG tablet, Take 1 tablet by mouth Daily., Disp: , Rfl:     hydroCHLOROthiazide (MICROZIDE) 12.5 MG capsule, Take 1 capsule by mouth Every Morning., Disp: , Rfl:     meclizine (ANTIVERT) 12.5 MG tablet, TAKE ONE TABLET BY MOUTH EVERY 12 HOURS AS NEEDED FOR 30 DAYS, Disp: , Rfl:     meloxicam (MOBIC) 15 MG tablet, Take 1 tablet by mouth Daily., Disp: , Rfl:     metFORMIN (GLUCOPHAGE) 500 MG tablet, Daily., Disp: , Rfl:     montelukast (SINGULAIR) 10 MG tablet, Take 1 tablet by mouth Every Evening., Disp: , Rfl:     topiramate (TOPAMAX) 50 MG tablet, Take 1 tablet by mouth Every Evening., Disp: , Rfl:     The following portions of the patient's history were reviewed and updated as appropriate: allergies, current medications, past family history, past medical  history, past social history, past surgical history, and problem list.      REVIEW OF PERTINENT MEDICAL DATA    Past Medical History:   Diagnosis Date    Cervical disc disorder     Chronic pain disorder     Cluster headache     COPD (chronic obstructive pulmonary disease)     Diabetes     Headache     Headache, tension-type     Joint pain     Low back pain     Migraine     Neck pain on left side     Sleep apnea      Past Surgical History:   Procedure Laterality Date    CHOLECYSTECTOMY       Family History   Problem Relation Age of Onset    Stroke Mother     Cancer Father     Cancer Sister     Early death Maternal Grandmother      Social History     Socioeconomic History    Marital status:    Tobacco Use    Smoking status: Every Day     Current packs/day: 1.00     Average packs/day: 1 pack/day for 15.0 years (15.0 ttl pk-yrs)     Types: Cigarettes   Vaping Use    Vaping status: Never Used   Substance and Sexual Activity    Alcohol use: Not Currently    Drug use: Never    Sexual activity: Not Currently     Partners: Male     Birth control/protection: Condom, Abstinence         Review of Systems   Musculoskeletal:  Positive for arthralgias and neck pain.   Neurological:  Positive for headaches.         Vitals:    11/19/24 1015   BP: 147/85   Pulse: 89   Resp: 16   SpO2: 98%   Weight: 103 kg (228 lb)   PainSc:   6               Objective   Physical Exam  Neck:     Musculoskeletal:         General: Tenderness present.        Arms:    Neurological:      Deep Tendon Reflexes:      Reflex Scores:       Tricep reflexes are 2+ on the right side and 2+ on the left side.       Bicep reflexes are 2+ on the right side and 2+ on the left side.       Brachioradialis reflexes are 2+ on the right side and 2+ on the left side.     Comments: Motor strength 5/5 b/l LE  Sensory intact b/l LE               Imaging Reviewed:  MRI cervical spine without contrast-4/19/2024-priority radiology  - C4-5-mild disc bulge  C5 6- facet  arthropathy more on the right side.  Mild to moderate spinal canal stenosis and moderate right neuroforaminal stenosis  C6-7-left foraminal disc bulge with left greater than right facet arthropathy.  Mild canal stenosis.  Moderate left and mild to moderate right neuroforaminal stenosis  C7-T1-no significant stenosis     Bilateral upper extremity EMG-1/18/2024-Dr. Bonilla  -Normal NCS of left arm.  Showed increased insertional activity from the left lower cervical paraspinal consistent with mild C6-7 radiculopathy.    Assessment:    1. Occipital neuralgia, unspecified laterality    2. Cervical radiculopathy    3. Cervical spondylosis    4. Occipital headache          Plan:   1.  Defer UDS for now.  2. We discussed trying a course of formal physical therapy.  Physical therapy can help strengthen and stretch the muscles around the joints. Continue to be as active as possible.  Patient has done physical therapy for more than 6 weeks.  3.  Discussed with patient that there are 2 issues affecting her pain.  I believe occipital neuralgia is causing pain in the base of her neck and headaches and cervical stenosis at C6-7 is causing the radicular pain.    4.  Good relief with ASHANTI. Some of her pain is returning. Discussed with patient that if Gabapentin doesn't provide significant relief in next few weeks, recommend repeating ASHANTI C6-7 (left).   6.Continue Gabapentin 600 mg qhs as per PCP. Discussed with patient that it may take up to 4-6 weeks for Gabapentin to be effective and recommend giving some time.     She will give me a call in next couple weeks after trying Gabapentin little longer.      Reynaldo Pearce DO  Pain Management   [facility]                INSPECT REPORT    As part of the patient's treatment plan, I may be prescribing controlled substances. The patient has been made aware of appropriate use of such medications, including potential risk of somnolence, limited ability to drive and/or work safely, and  the potential for dependence or overdose. It has also been made clear that these medications are for use by this patient only, without concomitant use of alcohol or other substances unless prescribed.     Patient has completed prescribing agreement detailing terms of continued prescribing of controlled substances, including monitoring INSPECT reports, urine drug screening, and pill counts if necessary. The patient is aware that inappropriate use will results in cessation of prescribing such medications.    INSPECT report has been reviewed and scanned into the patient's chart.

## 2024-11-19 ENCOUNTER — OFFICE VISIT (OUTPATIENT)
Dept: PAIN MEDICINE | Facility: CLINIC | Age: 59
End: 2024-11-19
Payer: MEDICAID

## 2024-11-19 VITALS
OXYGEN SATURATION: 98 % | HEART RATE: 89 BPM | DIASTOLIC BLOOD PRESSURE: 85 MMHG | SYSTOLIC BLOOD PRESSURE: 147 MMHG | BODY MASS INDEX: 39.14 KG/M2 | WEIGHT: 228 LBS | RESPIRATION RATE: 16 BRPM

## 2024-11-19 DIAGNOSIS — M54.81 OCCIPITAL NEURALGIA, UNSPECIFIED LATERALITY: Primary | ICD-10-CM

## 2024-11-19 DIAGNOSIS — M54.12 CERVICAL RADICULOPATHY: ICD-10-CM

## 2024-11-19 DIAGNOSIS — R51.9 OCCIPITAL HEADACHE: ICD-10-CM

## 2024-11-19 DIAGNOSIS — M47.812 CERVICAL SPONDYLOSIS: ICD-10-CM

## 2024-11-19 PROCEDURE — 1160F RVW MEDS BY RX/DR IN RCRD: CPT | Performed by: STUDENT IN AN ORGANIZED HEALTH CARE EDUCATION/TRAINING PROGRAM

## 2024-11-19 PROCEDURE — 99213 OFFICE O/P EST LOW 20 MIN: CPT | Performed by: STUDENT IN AN ORGANIZED HEALTH CARE EDUCATION/TRAINING PROGRAM

## 2024-11-19 PROCEDURE — 1159F MED LIST DOCD IN RCRD: CPT | Performed by: STUDENT IN AN ORGANIZED HEALTH CARE EDUCATION/TRAINING PROGRAM

## 2024-11-19 PROCEDURE — 1125F AMNT PAIN NOTED PAIN PRSNT: CPT | Performed by: STUDENT IN AN ORGANIZED HEALTH CARE EDUCATION/TRAINING PROGRAM

## 2024-11-19 RX ORDER — GABAPENTIN 600 MG/1
1 TABLET ORAL DAILY
COMMUNITY
Start: 2024-11-05

## 2025-04-28 ENCOUNTER — TELEPHONE (OUTPATIENT)
Dept: PAIN MEDICINE | Facility: CLINIC | Age: 60
End: 2025-04-28
Payer: MEDICAID

## 2025-04-28 NOTE — PROGRESS NOTES
Subjective   Leann Anaya is a 59 y.o. female is here for follow up for neck pain.  Patient was last seen on 11/2024. Increased headaches since last visit. She has stopped gabapentin due to side effects. She also stopped taking Melxicam.     On last visit:     Neck pain is 4/10 on VAS, at maximum is 8/10. Pain is achiness in nature. Pain is referred left triceps, forearm and fingers- this is mostly resolved with ASHANTI. The pain is constant. The pain is improved by PT, meloxicam, flexeril, ASHANTI. The pain is worse with turning head, increased activities. +headaches - once-twice a week - left side - start backs of head and wraps to top of head.  Her main complaint is pain in the base of her neck and headaches.    Left sided base of neck pain is 8/10 on VAS. Radiating to top of head. +headaches.     Previous Injection:   10/29/2024-left occipital nerve block- couple days of relief.   8/8/2024-ASHANTI C6-7- 70% pain improvement. Gripping is better. No locking of neck. No headaches.   6/11/2024-B/l occipital nerve block- 80% improvement in headaches since last visit. Only had 2 headaches since the injection- lasted for 3 months.      Hx: Referred by Tyler Jaramillo MD for Neck pain. Neck pain started long time ago without any injuries. She has tried NSAIDs, PT without significant improvement in pain.                  SOAPP- 6  [place] back disability scale - 40     PMH:   DM-2, BPV, AMY, COPD, smoker- 1 PPD     Current Medications:   Flexeril 10 mg   Meclizine        Past Medications:  Percocet - caused nausea   Gabapentin - agitation   Meloxicam 15 mg daily as needed     Past Modalities:  TENS:                                                                          no                                                  Physical Therapy Within The Last 6 Months              Yes- [facility] - 3/2024 - >7 weeks.   Psychotherapy                                                            no  Massage Therapy                                                        no     Patient Complains Of:  Uro-Fecal Incontinence          no  Weight Gain/Loss                   no  Fever/Chills                             no  Weakness                               Yes - Left arm subjective weakness.         PEG Assessment   What number best describes your pain on average in the past week?4  What number best describes how, during the past week, pain has interfered with your enjoyment of life?6  What number best describes how, during the past week, pain has interfered with your general activity?  5         Current Outpatient Medications:     albuterol sulfate  (90 Base) MCG/ACT inhaler, INHALE 1 PUFF EVERY 4 HOURS AS NEEDED, Disp: , Rfl:     azelastine (ASTELIN) 0.1 % nasal spray, 1 spray 2 (Two) Times a Day., Disp: , Rfl:     cyclobenzaprine (FLEXERIL) 10 MG tablet, Take 1 tablet by mouth 3 times a day., Disp: , Rfl:     famotidine (PEPCID) 40 MG tablet, Take 1 tablet by mouth Daily With Breakfast., Disp: , Rfl:     fluticasone (FLONASE) 50 MCG/ACT nasal spray, 1 spray by Each Nare route 2 (Two) Times a Day., Disp: , Rfl:     hydroCHLOROthiazide (MICROZIDE) 12.5 MG capsule, Take 1 capsule by mouth Every Morning., Disp: , Rfl:     meclizine (ANTIVERT) 12.5 MG tablet, TAKE ONE TABLET BY MOUTH EVERY 12 HOURS AS NEEDED FOR 30 DAYS, Disp: , Rfl:     meloxicam (MOBIC) 15 MG tablet, Take 1 tablet by mouth Daily., Disp: , Rfl:     metFORMIN (GLUCOPHAGE) 500 MG tablet, Daily., Disp: , Rfl:     montelukast (SINGULAIR) 10 MG tablet, Take 1 tablet by mouth Every Evening., Disp: , Rfl:     Ozempic, 0.25 or 0.5 MG/DOSE, 2 MG/3ML solution pen-injector, Inject 0.5 mg under the skin into the appropriate area as directed 1 (One) Time Per Week., Disp: , Rfl:     topiramate (TOPAMAX) 50 MG tablet, Take 1 tablet by mouth Every Evening., Disp: , Rfl:     pregabalin (LYRICA) 50 MG capsule, Take 1 capsule by mouth 2 (Two) Times a Day for 60 days., Disp: 60  capsule, Rfl: 1    The following portions of the patient's history were reviewed and updated as appropriate: allergies, current medications, past family history, past medical history, past social history, past surgical history, and problem list.      REVIEW OF PERTINENT MEDICAL DATA    Past Medical History:   Diagnosis Date    Cervical disc disorder     Chronic pain disorder     Cluster headache     COPD (chronic obstructive pulmonary disease)     Diabetes     Headache     Headache, tension-type     Joint pain     Low back pain     Migraine     Neck pain on left side     Sleep apnea      Past Surgical History:   Procedure Laterality Date    CHOLECYSTECTOMY       Family History   Problem Relation Age of Onset    Stroke Mother     Cancer Father     Cancer Sister     Early death Maternal Grandmother      Social History     Socioeconomic History    Marital status:    Tobacco Use    Smoking status: Every Day     Current packs/day: 1.00     Average packs/day: 1 pack/day for 15.0 years (15.0 ttl pk-yrs)     Types: Cigarettes   Vaping Use    Vaping status: Never Used   Substance and Sexual Activity    Alcohol use: Not Currently    Drug use: Never    Sexual activity: Not Currently     Partners: Male     Birth control/protection: Condom, Abstinence         Review of Systems   Musculoskeletal:  Positive for arthralgias and neck pain.   Neurological:  Positive for headaches.         Vitals:    04/29/25 0920   BP: 126/85   Pulse: 92   Resp: 16   SpO2: 97%   Weight: 101 kg (223 lb)   PainSc: 8                  Objective   Physical Exam  Neck:     Musculoskeletal:         General: Tenderness present.        Arms:    Neurological:      Deep Tendon Reflexes:      Reflex Scores:       Tricep reflexes are 2+ on the right side and 2+ on the left side.       Bicep reflexes are 2+ on the right side and 2+ on the left side.       Brachioradialis reflexes are 2+ on the right side and 2+ on the left side.     Comments: Motor  strength 5/5 b/l LE  Sensory intact b/l LE               Imaging Reviewed:  MRI cervical spine without contrast-4/19/2024-priority radiology  - C4-5-mild disc bulge  C5 6- facet arthropathy more on the right side.  Mild to moderate spinal canal stenosis and moderate right neuroforaminal stenosis  C6-7-left foraminal disc bulge with left greater than right facet arthropathy.  Mild canal stenosis.  Moderate left and mild to moderate right neuroforaminal stenosis  C7-T1-no significant stenosis     Bilateral upper extremity EMG-1/18/2024-Dr. Bonilla  -Normal NCS of left arm.  Showed increased insertional activity from the left lower cervical paraspinal consistent with mild C6-7 radiculopathy.    Assessment:    1. Occipital neuralgia, unspecified laterality    2. Cervical radiculopathy    3. Cervical spondylosis    4. Occipital headache        Plan:   1.  Defer UDS for now.  2. We discussed trying a course of formal physical therapy.  Physical therapy can help strengthen and stretch the muscles around the joints. Continue to be as active as possible.  Patient has done physical therapy for more than 6 weeks.  3.  Discussed with patient that there are 2 issues affecting her pain.  I believe occipital neuralgia is causing pain in the base of her neck and headaches and cervical stenosis at C6-7 is causing the radicular pain.    4.  Good relief with ASHANTI. Some of her pain is returning. Discussed with patient that if Gabapentin doesn't provide significant relief in next few weeks, recommend repeating ASHANTI C6-7 (left).   6. Unable to tolerate gabapentin. Start Lyrica 50 mg qhs and then increase it to BID. Side effects discussed with the patient including but not limited to dizziness, blurry vision, weight gain, sleepiness, trouble concentrating, swelling of your hands and feet, dry mouth, feeling high and suicidal thoughts. Patient understands and agrees with the plan.  7. Discussed possibility of repeating L occipital N block  and if that doesn't last long enough, we may consider left sided TON, C3 MBB.       RTC in 2 months.      Reynaldo Pearce DO  Pain Management   [facility]                INSPECT REPORT    As part of the patient's treatment plan, I may be prescribing controlled substances. The patient has been made aware of appropriate use of such medications, including potential risk of somnolence, limited ability to drive and/or work safely, and the potential for dependence or overdose. It has also been made clear that these medications are for use by this patient only, without concomitant use of alcohol or other substances unless prescribed.     Patient has completed prescribing agreement detailing terms of continued prescribing of controlled substances, including monitoring INSPECT reports, urine drug screening, and pill counts if necessary. The patient is aware that inappropriate use will results in cessation of prescribing such medications.    INSPECT report has been reviewed and scanned into the patient's chart.

## 2025-04-28 NOTE — TELEPHONE ENCOUNTER
Caller: Leann Anaya    Relationship to patient: Self    Best call back number: [phone]    Chief complaint: Occipital neuralgia     Type of visit: follow up     Requested date: asap      Additional notes:the first available at any office is 06/19/2025

## 2025-04-29 ENCOUNTER — OFFICE VISIT (OUTPATIENT)
Dept: PAIN MEDICINE | Facility: CLINIC | Age: 60
End: 2025-04-29
Payer: MEDICAID

## 2025-04-29 VITALS
DIASTOLIC BLOOD PRESSURE: 85 MMHG | OXYGEN SATURATION: 97 % | RESPIRATION RATE: 16 BRPM | SYSTOLIC BLOOD PRESSURE: 126 MMHG | BODY MASS INDEX: 38.28 KG/M2 | HEART RATE: 92 BPM | WEIGHT: 223 LBS

## 2025-04-29 DIAGNOSIS — R51.9 OCCIPITAL HEADACHE: ICD-10-CM

## 2025-04-29 DIAGNOSIS — M54.81 OCCIPITAL NEURALGIA, UNSPECIFIED LATERALITY: Primary | ICD-10-CM

## 2025-04-29 DIAGNOSIS — M54.12 CERVICAL RADICULOPATHY: ICD-10-CM

## 2025-04-29 DIAGNOSIS — M47.812 CERVICAL SPONDYLOSIS: ICD-10-CM

## 2025-04-29 RX ORDER — SEMAGLUTIDE 0.68 MG/ML
0.5 INJECTION, SOLUTION SUBCUTANEOUS WEEKLY
COMMUNITY
Start: 2025-04-13

## 2025-04-29 RX ORDER — PREGABALIN 50 MG/1
50 CAPSULE ORAL 2 TIMES DAILY
Qty: 60 CAPSULE | Refills: 1 | Status: SHIPPED | OUTPATIENT
Start: 2025-04-29 | End: 2025-06-28

## 2025-05-06 ENCOUNTER — TRANSCRIBE ORDERS (OUTPATIENT)
Dept: ADMINISTRATIVE | Facility: HOSPITAL | Age: 60
End: 2025-05-06
Payer: MEDICAID

## 2025-05-06 DIAGNOSIS — J44.9 CHRONIC OBSTRUCTIVE PULMONARY DISEASE, UNSPECIFIED COPD TYPE: Primary | ICD-10-CM

## 2025-05-06 DIAGNOSIS — R91.1 PULMONARY NODULE: ICD-10-CM

## 2025-06-02 ENCOUNTER — HOSPITAL ENCOUNTER (OUTPATIENT)
Dept: RESPIRATORY THERAPY | Facility: HOSPITAL | Age: 60
Discharge: HOME | End: 2025-06-02
Admitting: INTERNAL MEDICINE
Payer: OTHER GOVERNMENT

## 2025-06-02 VITALS — OXYGEN SATURATION: 98 % | RESPIRATION RATE: 14 BRPM | HEART RATE: 94 BPM

## 2025-06-02 DIAGNOSIS — J44.9 CHRONIC OBSTRUCTIVE PULMONARY DISEASE, UNSPECIFIED COPD TYPE: ICD-10-CM

## 2025-06-02 PROCEDURE — 94664 DEMO&/EVAL PT USE INHALER: CPT

## 2025-06-02 PROCEDURE — 94729 DIFFUSING CAPACITY: CPT

## 2025-06-02 PROCEDURE — 94799 UNLISTED PULMONARY SVC/PX: CPT

## 2025-06-02 PROCEDURE — 94726 PLETHYSMOGRAPHY LUNG VOLUMES: CPT

## 2025-06-02 PROCEDURE — 94640 AIRWAY INHALATION TREATMENT: CPT

## 2025-06-02 PROCEDURE — 94060 EVALUATION OF WHEEZING: CPT

## 2025-06-02 RX ORDER — ALBUTEROL SULFATE 90 UG/1
2 INHALANT RESPIRATORY (INHALATION) ONCE
Status: COMPLETED | OUTPATIENT
Start: 2025-06-02 | End: 2025-06-02

## 2025-06-02 RX ADMIN — ALBUTEROL SULFATE 2 PUFF: 90 AEROSOL, METERED RESPIRATORY (INHALATION) at 14:20

## 2025-06-23 RX ORDER — PREGABALIN 50 MG/1
50 CAPSULE ORAL 2 TIMES DAILY
Qty: 60 CAPSULE | Refills: 1 | Status: CANCELLED | OUTPATIENT
Start: 2025-06-23 | End: 2025-08-22

## 2025-06-23 NOTE — PROGRESS NOTES
Subjective   Leann Anaya is a 59 y.o. female is here for follow up for neck pain.  Last seen on 4/29/2025.    On last visit: Started Lyrica- improved pain. Not waking up with headaches in morning.     Neck pain is 4/10 on VAS, at maximum is 8/10. Pain is achiness in nature. Pain is referred left triceps, forearm and fingers- this is mostly resolved with ASHANTI. The pain is constant. The pain is improved by PT, meloxicam, flexeril, ASHANTI. The pain is worse with turning head, increased activities. +headaches - once-twice a week - left side - start backs of head and wraps to top of head.  Her main complaint is pain in the base of her neck and headaches.    Left sided base of neck pain is 8/10 on VAS. Radiating to top of head. +headaches.     Previous Injection:   10/29/2024-left occipital nerve block- couple days of relief.   8/8/2024-ASHANTI C6-7- 70% pain improvement. Gripping is better. No locking of neck. No headaches.   6/11/2024-B/l occipital nerve block- 80% improvement in headaches since last visit. Only had 2 headaches since the injection- lasted for 3 months.      Hx: Referred by Tyler Jaramillo MD for Neck pain. Neck pain started long time ago without any injuries. She has tried NSAIDs, PT without significant improvement in pain.                  SOAPP- 6  [place] back disability scale - 40     PMH:   DM-2, BPV, AMY, COPD, smoker- 1 PPD     Current Medications:   Flexeril 10 mg   Meclizine        Past Medications:  Percocet - caused nausea   Gabapentin - agitation   Meloxicam 15 mg daily as needed     Past Modalities:  TENS:                                                                          no                                                  Physical Therapy Within The Last 6 Months              Yes- [facility] - 3/2024 - >7 weeks.   Psychotherapy                                                            no  Massage Therapy                                                       no      Patient Complains Of:  Uro-Fecal Incontinence          no  Weight Gain/Loss                   no  Fever/Chills                             no  Weakness                               Yes - Left arm subjective weakness.         PEG Assessment   What number best describes your pain on average in the past week?4  What number best describes how, during the past week, pain has interfered with your enjoyment of life?6  What number best describes how, during the past week, pain has interfered with your general activity?  5         Current Outpatient Medications:     albuterol sulfate  (90 Base) MCG/ACT inhaler, INHALE 1 PUFF EVERY 4 HOURS AS NEEDED, Disp: , Rfl:     azelastine (ASTELIN) 0.1 % nasal spray, 1 spray 2 (Two) Times a Day., Disp: , Rfl:     cyclobenzaprine (FLEXERIL) 10 MG tablet, Take 1 tablet by mouth 3 times a day., Disp: , Rfl:     famotidine (PEPCID) 40 MG tablet, Take 1 tablet by mouth Daily With Breakfast., Disp: , Rfl:     fluticasone (FLONASE) 50 MCG/ACT nasal spray, 1 spray by Each Nare route 2 (Two) Times a Day., Disp: , Rfl:     hydroCHLOROthiazide (MICROZIDE) 12.5 MG capsule, Take 1 capsule by mouth Every Morning., Disp: , Rfl:     meclizine (ANTIVERT) 12.5 MG tablet, TAKE ONE TABLET BY MOUTH EVERY 12 HOURS AS NEEDED FOR 30 DAYS, Disp: , Rfl:     meloxicam (MOBIC) 15 MG tablet, Take 1 tablet by mouth Daily., Disp: , Rfl:     metFORMIN (GLUCOPHAGE) 500 MG tablet, Daily., Disp: , Rfl:     montelukast (SINGULAIR) 10 MG tablet, Take 1 tablet by mouth Every Evening., Disp: , Rfl:     Ozempic, 0.25 or 0.5 MG/DOSE, 2 MG/3ML solution pen-injector, Inject 0.5 mg under the skin into the appropriate area as directed 1 (One) Time Per Week., Disp: , Rfl:     pregabalin (LYRICA) 50 MG capsule, Take 1 capsule by mouth 2 (Two) Times a Day for 60 days., Disp: 60 capsule, Rfl: 1    topiramate (TOPAMAX) 50 MG tablet, Take 1 tablet by mouth Every Evening., Disp: , Rfl:     The following portions of the  patient's history were reviewed and updated as appropriate: allergies, current medications, past family history, past medical history, past social history, past surgical history, and problem list.      REVIEW OF PERTINENT MEDICAL DATA    Past Medical History:   Diagnosis Date    Cervical disc disorder     Chronic pain disorder     Cluster headache     COPD (chronic obstructive pulmonary disease)     Diabetes     Headache     Headache, tension-type     Joint pain     Low back pain     Migraine     Neck pain on left side     Sleep apnea      Past Surgical History:   Procedure Laterality Date    CHOLECYSTECTOMY       Family History   Problem Relation Age of Onset    Stroke Mother     Cancer Father     Cancer Sister     Early death Maternal Grandmother      Social History     Socioeconomic History    Marital status:    Tobacco Use    Smoking status: Every Day     Current packs/day: 1.00     Average packs/day: 1 pack/day for 15.0 years (15.0 ttl pk-yrs)     Types: Cigarettes   Vaping Use    Vaping status: Never Used   Substance and Sexual Activity    Alcohol use: Not Currently    Drug use: Never    Sexual activity: Not Currently     Partners: Male     Birth control/protection: Condom, Abstinence         Review of Systems   Musculoskeletal:  Positive for arthralgias and neck pain.   Neurological:  Positive for headaches.         There were no vitals filed for this visit.                Objective   Physical Exam  Neck:     Musculoskeletal:         General: Tenderness present.        Arms:    Neurological:      Deep Tendon Reflexes:      Reflex Scores:       Tricep reflexes are 2+ on the right side and 2+ on the left side.       Bicep reflexes are 2+ on the right side and 2+ on the left side.       Brachioradialis reflexes are 2+ on the right side and 2+ on the left side.     Comments: Motor strength 5/5 b/l LE  Sensory intact b/l LE               Imaging Reviewed:  MRI cervical spine without  contrast-4/19/2024-priority radiology  - C4-5-mild disc bulge  C5 6- facet arthropathy more on the right side.  Mild to moderate spinal canal stenosis and moderate right neuroforaminal stenosis  C6-7-left foraminal disc bulge with left greater than right facet arthropathy.  Mild canal stenosis.  Moderate left and mild to moderate right neuroforaminal stenosis  C7-T1-no significant stenosis     Bilateral upper extremity EMG-1/18/2024-Dr. Bonilla  -Normal NCS of left arm.  Showed increased insertional activity from the left lower cervical paraspinal consistent with mild C6-7 radiculopathy.    Assessment:    1. Occipital neuralgia, unspecified laterality    2. Cervical radiculopathy    3. Cervical spondylosis    4. Occipital headache          Plan:   1.  Defer UDS for now.  2. We discussed trying a course of formal physical therapy.  Physical therapy can help strengthen and stretch the muscles around the joints. Continue to be as active as possible.  Patient has done physical therapy for more than 6 weeks.  3.  Discussed with patient that there are 2 issues affecting her pain.  I believe occipital neuralgia is causing pain in the base of her neck and headaches and cervical stenosis at C6-7 is causing the radicular pain.    4.  Good relief with ASHANTI. Some of her pain is returning. Discussed with patient that if Gabapentin doesn't provide significant relief in next few weeks, recommend repeating ASHANTI C6-7 (left).   6. Unable to tolerate gabapentin.Increase Lyrica 75 mg qhs BID. Side effects discussed with the patient including but not limited to dizziness, blurry vision, weight gain, sleepiness, trouble concentrating, swelling of your hands and feet, dry mouth, feeling high and suicidal thoughts. Patient understands and agrees with the plan.  7. Discussed possibility of repeating L occipital N block and if that doesn't last long enough, we may consider left sided TON, C3 MBB.       RTC in 2 months.      Reynaldo Pearce  DO  Pain Management   [facility]                INSPECT REPORT    As part of the patient's treatment plan, I may be prescribing controlled substances. The patient has been made aware of appropriate use of such medications, including potential risk of somnolence, limited ability to drive and/or work safely, and the potential for dependence or overdose. It has also been made clear that these medications are for use by this patient only, without concomitant use of alcohol or other substances unless prescribed.     Patient has completed prescribing agreement detailing terms of continued prescribing of controlled substances, including monitoring INSPECT reports, urine drug screening, and pill counts if necessary. The patient is aware that inappropriate use will results in cessation of prescribing such medications.    INSPECT report has been reviewed and scanned into the patient's chart.

## 2025-06-24 ENCOUNTER — OFFICE VISIT (OUTPATIENT)
Dept: PAIN MEDICINE | Facility: CLINIC | Age: 60
End: 2025-06-24
Payer: OTHER GOVERNMENT

## 2025-06-24 VITALS
DIASTOLIC BLOOD PRESSURE: 88 MMHG | HEART RATE: 96 BPM | BODY MASS INDEX: 38.79 KG/M2 | OXYGEN SATURATION: 96 % | SYSTOLIC BLOOD PRESSURE: 133 MMHG | WEIGHT: 226 LBS | RESPIRATION RATE: 16 BRPM

## 2025-06-24 DIAGNOSIS — M47.812 CERVICAL SPONDYLOSIS: ICD-10-CM

## 2025-06-24 DIAGNOSIS — R51.9 OCCIPITAL HEADACHE: ICD-10-CM

## 2025-06-24 DIAGNOSIS — M54.12 CERVICAL RADICULOPATHY: ICD-10-CM

## 2025-06-24 DIAGNOSIS — M54.81 OCCIPITAL NEURALGIA, UNSPECIFIED LATERALITY: ICD-10-CM

## 2025-06-24 PROCEDURE — 1125F AMNT PAIN NOTED PAIN PRSNT: CPT | Performed by: STUDENT IN AN ORGANIZED HEALTH CARE EDUCATION/TRAINING PROGRAM

## 2025-06-24 PROCEDURE — 99214 OFFICE O/P EST MOD 30 MIN: CPT | Performed by: STUDENT IN AN ORGANIZED HEALTH CARE EDUCATION/TRAINING PROGRAM

## 2025-06-24 PROCEDURE — 1160F RVW MEDS BY RX/DR IN RCRD: CPT | Performed by: STUDENT IN AN ORGANIZED HEALTH CARE EDUCATION/TRAINING PROGRAM

## 2025-06-24 PROCEDURE — 1159F MED LIST DOCD IN RCRD: CPT | Performed by: STUDENT IN AN ORGANIZED HEALTH CARE EDUCATION/TRAINING PROGRAM

## 2025-06-24 RX ORDER — PREGABALIN 75 MG/1
75 CAPSULE ORAL 2 TIMES DAILY
Qty: 60 CAPSULE | Refills: 2 | Status: SHIPPED | OUTPATIENT
Start: 2025-06-24 | End: 2025-09-22

## 2025-06-24 RX ORDER — BUDESONIDE 0.5 MG/2ML
INHALANT ORAL EVERY 12 HOURS SCHEDULED
COMMUNITY
Start: 2025-03-18

## 2025-06-24 RX ORDER — PANTOPRAZOLE SODIUM 40 MG/1
40 TABLET, DELAYED RELEASE ORAL DAILY
COMMUNITY